# Patient Record
Sex: FEMALE | Race: WHITE | URBAN - METROPOLITAN AREA
[De-identification: names, ages, dates, MRNs, and addresses within clinical notes are randomized per-mention and may not be internally consistent; named-entity substitution may affect disease eponyms.]

---

## 2017-03-22 ENCOUNTER — OFFICE VISIT (OUTPATIENT)
Dept: FAMILY MEDICINE CLINIC | Facility: CLINIC | Age: 22
End: 2017-03-22

## 2017-03-22 VITALS
WEIGHT: 133 LBS | HEART RATE: 74 BPM | TEMPERATURE: 98 F | HEIGHT: 66 IN | OXYGEN SATURATION: 99 % | DIASTOLIC BLOOD PRESSURE: 70 MMHG | BODY MASS INDEX: 21.38 KG/M2 | SYSTOLIC BLOOD PRESSURE: 108 MMHG | RESPIRATION RATE: 20 BRPM

## 2017-03-22 DIAGNOSIS — H10.31 ACUTE BACTERIAL CONJUNCTIVITIS OF RIGHT EYE: Primary | ICD-10-CM

## 2017-03-22 PROCEDURE — 99203 OFFICE O/P NEW LOW 30 MIN: CPT | Performed by: NURSE PRACTITIONER

## 2017-03-22 RX ORDER — TOBRAMYCIN 3 MG/ML
2 SOLUTION/ DROPS OPHTHALMIC EVERY 6 HOURS
Qty: 5 ML | Refills: 0 | Status: SHIPPED | OUTPATIENT
Start: 2017-03-22 | End: 2017-03-29

## 2017-03-22 NOTE — PROGRESS NOTES
CHIEF COMPLAINT:   Patient presents with:  Conjunctivitis: right      HPI:   Shay Catalan is a 24year old female who presents with chief complaint right of \"pink eye\". Symptoms began  1  days ago. Symptoms have been worsened since onset.    Patient rep eye  (primary encounter diagnosis)    PLAN: Hygeine and comfort care as listen in patient instructions.   Medication as listed below       Signed Prescriptions Disp Refills    Tobramycin Sulfate 0.3 % Ophthalmic Solution 5 mL 0      Sig: Place 2 drops into

## 2017-03-22 NOTE — PATIENT INSTRUCTIONS
· It is very important to finish full course of antibiotics. · Avoid touching eyes. · Stressed importance of good handwashing as conjunctivitis is very contagious. · Warm compresses to affected eye prn.   · Can return to work/school after on [facility] [employer] [facility]

## (undated) NOTE — MR AVS SNAPSHOT
[facility] [address]  [address] [place] [address] Road  [phone]               Thank you for choosing us for your health care visit with TACO Tanner.   We are glad to serve you and happy to provide you with this summary of your [facility]     Sign up for [employer], your secure online medical record. [employer] will allow you to access patient instructions from your recent visit,  view other health information, and more. To sign up or find more information, go to [email or web address]